# Patient Record
Sex: FEMALE | Race: WHITE | Employment: OTHER
[De-identification: names, ages, dates, MRNs, and addresses within clinical notes are randomized per-mention and may not be internally consistent; named-entity substitution may affect disease eponyms.]

---

## 2019-07-03 NOTE — PATIENT DISCUSSION
Muñoz Visual Field 36 point screen: I have reviewed the visual fields both taped and untaped on this patient which demonstrate significant obstruction of the patient's peripheral visual field on both eyes.

## 2019-08-30 NOTE — PATIENT DISCUSSION
Also, please do not hesitate to call us if you have any concerns not addressed by this information. Please call [phone] and we will do everything we can to help you during this period.

## 2020-10-27 NOTE — PATIENT DISCUSSION
GLAUCOMA:  I have talked with the patient about my impressions, explained our treatment plan, and have answered all questions and patient understands. Continue present eye drops. Will need him to be Dilated at return. RNFL as well.

## 2021-08-12 ENCOUNTER — PREPPED CHART (OUTPATIENT)
Age: 76
End: 2021-08-12

## 2021-08-17 ENCOUNTER — ESTABLISHED COMPREHENSIVE EXAM (OUTPATIENT)
Age: 76
End: 2021-08-17

## 2021-08-17 VITALS — HEIGHT: 55 IN

## 2021-08-17 DIAGNOSIS — H26.491: ICD-10-CM

## 2021-08-17 DIAGNOSIS — Z96.1: ICD-10-CM

## 2021-08-17 PROCEDURE — 92014 COMPRE OPH EXAM EST PT 1/>: CPT

## 2021-08-17 PROCEDURE — 92015VEC REFRACTION-VEC

## 2021-08-17 ASSESSMENT — TONOMETRY
OD_IOP_MMHG: 19
OS_IOP_MMHG: 18

## 2021-08-17 ASSESSMENT — VISUAL ACUITY
OD_CC: 20/20-1
OS_CC: 20/60-1

## 2021-09-14 NOTE — PATIENT DISCUSSION
GLAUCOMA:  I have talked with the patient about my impressions, explained our treatment plan, and have answered all questions and patient understands. Continue present eye drops.  Patient to follow up in 6 months

## 2021-09-14 NOTE — PATIENT DISCUSSION
YAG Peripheral Iridotomy OS I have explained the indications, alternatives, risks and potential benefits of the procedure to the patient who fully understands and has given informed consent. The patient is told to return immediately should any sustained loss of vision, symptoms of retinal detachment, or pain occur during the postoperative period.

## 2022-04-05 NOTE — PATIENT DISCUSSION
Discussed lid hygiene, warm compress and eyelid wash. samples of artificial tears given in office today.

## 2022-07-09 ENCOUNTER — TELEPHONE ENCOUNTER (OUTPATIENT)
Dept: URBAN - METROPOLITAN AREA CLINIC 121 | Facility: CLINIC | Age: 77
End: 2022-07-09

## 2022-07-10 ENCOUNTER — TELEPHONE ENCOUNTER (OUTPATIENT)
Dept: URBAN - METROPOLITAN AREA CLINIC 121 | Facility: CLINIC | Age: 77
End: 2022-07-10

## 2022-07-10 RX ORDER — CALCIUM CARBONATE 500 MG/1
TABLET, CHEWABLE ORAL ONCE A DAY
Refills: 0 | Status: ACTIVE | COMMUNITY
Start: 2008-12-02

## 2022-08-15 ENCOUNTER — ESTABLISHED PATIENT (OUTPATIENT)
Dept: URBAN - METROPOLITAN AREA CLINIC 27 | Facility: CLINIC | Age: 77
End: 2022-08-15

## 2022-08-15 DIAGNOSIS — H26.491: ICD-10-CM

## 2022-08-15 PROCEDURE — 92015 DETERMINE REFRACTIVE STATE: CPT

## 2022-08-15 PROCEDURE — 92014 COMPRE OPH EXAM EST PT 1/>: CPT

## 2022-08-15 ASSESSMENT — VISUAL ACUITY
OS_CC: 20/50-2
OD_SC: 20/50
OS_CC: J5
OD_PH: 20/25
OS_PH: 20/60+1
OD_CC: 20/20
OS_SC: 20/60
OD_CC: J1

## 2022-08-15 ASSESSMENT — TONOMETRY
OD_IOP_MMHG: 19
OS_IOP_MMHG: 18

## 2023-05-16 NOTE — PATIENT DISCUSSION
EYEBROW PTOSIS, OU:  SCHEDULE DIRECT BROW LIFT, OU. Bexarotene Counseling:  I discussed with the patient the risks of bexarotene including but not limited to hair loss, dry lips/skin/eyes, liver abnormalities, hyperlipidemia, pancreatitis, depression/suicidal ideation, photosensitivity, drug rash/allergic reactions, hypothyroidism, anemia, leukopenia, infection, cataracts, and teratogenicity.  Patient understands that they will need regular blood tests to check lipid profile, liver function tests, white blood cell count, thyroid function tests and pregnancy test if applicable.

## 2023-06-27 ENCOUNTER — COMPREHENSIVE EXAM (OUTPATIENT)
Dept: URBAN - METROPOLITAN AREA CLINIC 27 | Facility: CLINIC | Age: 78
End: 2023-06-27

## 2023-06-27 DIAGNOSIS — H26.491: ICD-10-CM

## 2023-06-27 DIAGNOSIS — H43.811: ICD-10-CM

## 2023-06-27 DIAGNOSIS — H53.022: ICD-10-CM

## 2023-06-27 PROCEDURE — 92015 DETERMINE REFRACTIVE STATE: CPT

## 2023-06-27 PROCEDURE — 92014 COMPRE OPH EXAM EST PT 1/>: CPT

## 2023-06-27 ASSESSMENT — VISUAL ACUITY
OD_CC: 20/20
OS_CC: 20/50-2

## 2023-06-27 ASSESSMENT — TONOMETRY
OD_IOP_MMHG: 15
OS_IOP_MMHG: 16

## 2024-05-15 ENCOUNTER — COMPREHENSIVE EXAM (OUTPATIENT)
Dept: URBAN - METROPOLITAN AREA CLINIC 27 | Facility: CLINIC | Age: 79
End: 2024-05-15

## 2024-05-15 DIAGNOSIS — H26.491: ICD-10-CM

## 2024-05-15 DIAGNOSIS — H02.054: ICD-10-CM

## 2024-05-15 DIAGNOSIS — Z96.1: ICD-10-CM

## 2024-05-15 DIAGNOSIS — H53.022: ICD-10-CM

## 2024-05-15 DIAGNOSIS — H43.811: ICD-10-CM

## 2024-05-15 DIAGNOSIS — H04.123: ICD-10-CM

## 2024-05-15 PROCEDURE — 99214 OFFICE O/P EST MOD 30 MIN: CPT

## 2024-05-15 PROCEDURE — 92015 DETERMINE REFRACTIVE STATE: CPT

## 2024-05-15 ASSESSMENT — TONOMETRY
OD_IOP_MMHG: 13
OS_IOP_MMHG: 12

## 2024-05-15 ASSESSMENT — VISUAL ACUITY
OU_CC: J1+
OS_CC: 20/50
OD_CC: 20/25

## 2025-06-16 ENCOUNTER — COMPREHENSIVE EXAM (OUTPATIENT)
Age: 80
End: 2025-06-16

## 2025-06-16 DIAGNOSIS — Z96.1: ICD-10-CM

## 2025-06-16 DIAGNOSIS — H26.491: ICD-10-CM

## 2025-06-16 DIAGNOSIS — H43.811: ICD-10-CM

## 2025-06-16 DIAGNOSIS — H02.054: ICD-10-CM

## 2025-06-16 DIAGNOSIS — H04.123: ICD-10-CM

## 2025-06-16 DIAGNOSIS — H53.022: ICD-10-CM

## 2025-06-16 PROCEDURE — 92015 DETERMINE REFRACTIVE STATE: CPT

## 2025-06-16 PROCEDURE — 99214 OFFICE O/P EST MOD 30 MIN: CPT
